# Patient Record
Sex: MALE | Race: WHITE | NOT HISPANIC OR LATINO | URBAN - METROPOLITAN AREA
[De-identification: names, ages, dates, MRNs, and addresses within clinical notes are randomized per-mention and may not be internally consistent; named-entity substitution may affect disease eponyms.]

---

## 2019-09-10 ENCOUNTER — OFFICE (OUTPATIENT)
Dept: URBAN - METROPOLITAN AREA CLINIC 19 | Facility: CLINIC | Age: 84
End: 2019-09-10

## 2019-09-10 VITALS
SYSTOLIC BLOOD PRESSURE: 128 MMHG | HEART RATE: 71 BPM | HEIGHT: 71 IN | WEIGHT: 171 LBS | DIASTOLIC BLOOD PRESSURE: 68 MMHG

## 2019-09-10 DIAGNOSIS — K21.9 GASTRO-ESOPHAGEAL REFLUX DISEASE WITHOUT ESOPHAGITIS: ICD-10-CM

## 2019-09-10 DIAGNOSIS — D50.9 IRON DEFICIENCY ANEMIA, UNSPECIFIED: ICD-10-CM

## 2019-09-10 PROCEDURE — 99214 OFFICE O/P EST MOD 30 MIN: CPT | Performed by: INTERNAL MEDICINE

## 2020-06-16 ENCOUNTER — OFFICE (OUTPATIENT)
Dept: URBAN - METROPOLITAN AREA CLINIC 19 | Facility: CLINIC | Age: 85
End: 2020-06-16

## 2020-06-16 VITALS
DIASTOLIC BLOOD PRESSURE: 66 MMHG | WEIGHT: 170 LBS | HEIGHT: 71 IN | SYSTOLIC BLOOD PRESSURE: 124 MMHG | HEART RATE: 67 BPM

## 2020-06-16 DIAGNOSIS — K21.9 GASTRO-ESOPHAGEAL REFLUX DISEASE WITHOUT ESOPHAGITIS: ICD-10-CM

## 2020-06-16 DIAGNOSIS — D64.9 ANEMIA, UNSPECIFIED: ICD-10-CM

## 2020-06-16 PROCEDURE — 99214 OFFICE O/P EST MOD 30 MIN: CPT | Performed by: INTERNAL MEDICINE

## 2022-04-01 ENCOUNTER — OFFICE (OUTPATIENT)
Dept: URBAN - METROPOLITAN AREA CLINIC 19 | Facility: CLINIC | Age: 87
End: 2022-04-01

## 2022-04-01 VITALS
SYSTOLIC BLOOD PRESSURE: 126 MMHG | HEART RATE: 68 BPM | WEIGHT: 155 LBS | OXYGEN SATURATION: 99 % | DIASTOLIC BLOOD PRESSURE: 69 MMHG | HEIGHT: 71 IN

## 2022-04-01 DIAGNOSIS — D50.9 IRON DEFICIENCY ANEMIA, UNSPECIFIED: ICD-10-CM

## 2022-04-01 DIAGNOSIS — K64.8 OTHER HEMORRHOIDS: ICD-10-CM

## 2022-04-01 DIAGNOSIS — K21.9 GASTRO-ESOPHAGEAL REFLUX DISEASE WITHOUT ESOPHAGITIS: ICD-10-CM

## 2022-04-01 PROCEDURE — 99213 OFFICE O/P EST LOW 20 MIN: CPT

## 2022-04-01 RX ORDER — HYDROCORTISONE ACETATE 25 MG/1
SUPPOSITORY RECTAL
Qty: 10 | Refills: 1 | Status: ACTIVE
Start: 2022-04-01

## 2022-04-01 NOTE — SERVICEHPINOTES
89-year-old white male returns with his daughter for complains of a rectal "growth".  Last week after a bowel movement, he noticed a rectal "growth" when he wiped.  His wife and his daughter both evaluated at were concerned.  They denied that he has had any overt GI bleeding or pain with bowel movements.  His bowel movements are typically fairly regular, though he does sit for long periods of time.  He does not typically have constipation or diarrhea.  His chronic reflux is well managed on Protonix 40 mg daily.  He is otherwise asymptomatic.
br
br   He was last here to see Dr. Roblero 6/16/20 for evaluation of anemia. He was also asymptomatic at that time, denying abdominal pain, nausea, reflux (on PPI), dysphagia, change in bowel habit or overt GI bleeding.  Dr. Roblero had also seen him 9/10/19 when his stool was guaiac negative, but subsequently, his stool is positive.  His hematocrit was 25.6% on 5/22/20.  He was given two IV iron infusions and 1 U PRBCs and his last Hct=31%. No additional GI tests (i.e. EGD/colonoscopy, SBE, etc) were felt necessary at that time due to his age and state of health.  He was hospitalized at [facility] in 2018 for severe anemia and occult positive stool. Yoshi Poe M.D. performed EGD/colonoscopy 8/10/18 and found a hiatal hernia with mild reflux esophagitis and gastritis. Colonoscopy found hemorrhoids and diverticula. Biopsies were negative for Dao's and H. pylori.He has been followed by hematologist, APRYL Ramirez M.D. and diagnosed with (HHT) Kendy Ana Rendu syndrome and, more recently, myelodysplastic syndrome with "refractory anemia” on a bone marrow biopsy last fall and is on Aranesp. He had a nuclear medicine GI bleeding scan 8/22/19 and Meckel scan 8/26/19 which were normal. His pacemaker has been a contraindication to getting a Pillcam. He has been treated with IV iron infusion, Procrit and blood transfusions (he is  intolerant to oral iron supplementation). He has been on  therapeutic anticoagulation, but Dr. Bergman stopped it when he  developed his anemia.  He now takes only  ASA 81 mg/d.A mucosal prolapse polyp was found on colonoscopy 4/25/97. Subsequent colonoscopy 11/18/03 and 11/28/16 found only diverticulosis coli. EGD 9/27/07 found a hiatal hernia and reflux esophagitis. AUS and HIDA scan were unremarkable in 2007. FHx is negative for colon neoplasm.

## 2022-04-01 NOTE — SERVICENOTES
The patient's assessment was reviewed with Dr. Roblero and a collaborative plan of care was established.